# Patient Record
Sex: FEMALE | Race: OTHER | HISPANIC OR LATINO | URBAN - METROPOLITAN AREA
[De-identification: names, ages, dates, MRNs, and addresses within clinical notes are randomized per-mention and may not be internally consistent; named-entity substitution may affect disease eponyms.]

---

## 2024-01-08 ENCOUNTER — OFFICE VISIT (OUTPATIENT)
Dept: INTERNAL MEDICINE | Facility: CLINIC | Age: 29
End: 2024-01-08

## 2024-01-08 VITALS
HEART RATE: 71 BPM | DIASTOLIC BLOOD PRESSURE: 80 MMHG | SYSTOLIC BLOOD PRESSURE: 126 MMHG | HEIGHT: 66 IN | TEMPERATURE: 98 F | BODY MASS INDEX: 29.55 KG/M2 | WEIGHT: 183.88 LBS | RESPIRATION RATE: 16 BRPM | OXYGEN SATURATION: 97 %

## 2024-01-08 DIAGNOSIS — H65.02 NON-RECURRENT ACUTE SEROUS OTITIS MEDIA OF LEFT EAR: Primary | ICD-10-CM

## 2024-01-08 PROBLEM — Z30.2 ENCOUNTER FOR STERILIZATION: Status: ACTIVE | Noted: 2021-12-06

## 2024-01-08 PROBLEM — R04.0 FREQUENT NOSEBLEEDS: Status: ACTIVE | Noted: 2017-07-26

## 2024-01-08 PROCEDURE — 99999 PR PBB SHADOW E&M-NEW PATIENT-LVL IV: CPT | Mod: PBBFAC,,, | Performed by: STUDENT IN AN ORGANIZED HEALTH CARE EDUCATION/TRAINING PROGRAM

## 2024-01-08 PROCEDURE — 99214 OFFICE O/P EST MOD 30 MIN: CPT | Mod: S$PBB,,, | Performed by: STUDENT IN AN ORGANIZED HEALTH CARE EDUCATION/TRAINING PROGRAM

## 2024-01-08 PROCEDURE — 99204 OFFICE O/P NEW MOD 45 MIN: CPT | Mod: PBBFAC,PO | Performed by: STUDENT IN AN ORGANIZED HEALTH CARE EDUCATION/TRAINING PROGRAM

## 2024-01-08 RX ORDER — MECLIZINE HCL 12.5 MG 12.5 MG/1
12.5 TABLET ORAL 2 TIMES DAILY PRN
Qty: 10 TABLET | Refills: 0 | Status: SHIPPED | OUTPATIENT
Start: 2024-01-08 | End: 2024-01-15

## 2024-01-08 RX ORDER — AMOXICILLIN AND CLAVULANATE POTASSIUM 875; 125 MG/1; MG/1
1 TABLET, FILM COATED ORAL EVERY 12 HOURS
Qty: 14 TABLET | Refills: 0 | Status: SHIPPED | OUTPATIENT
Start: 2024-01-08 | End: 2024-01-15

## 2024-01-08 NOTE — PROGRESS NOTES
"  Chief Complaint   Patient presents with    Otalgia     Right ear pain x 1 week     HPI: Joceline Darby is a 28 y.o. female  with Pmhx listed below who presents to clinic for evaluation of otalgia. As per the patient, she has been having dizziness all day through out the day for last 7 days. She reports that when she puts "ear drop in her ear ,it doesn't go in". She has been using OTC ear drop on her right earache. She reports that when she applies pressure in her ear, the dizziness stops.  She dneies having fever, but does report to have chills. She reports that " room is spinning around her". She denies having any issues with hearing. She denies having runny nose,congestion, sick contact at this time. She has been taking left over amoxicillin once a day for last 2 days which has not been helping either.     Problem List:  Patient Active Problem List   Diagnosis    Frequent nosebleeds    Encounter for sterilization       ROS: Negative except as noted above.       Current Meds:  Current Outpatient Medications   Medication Sig Dispense Refill    amoxicillin-clavulanate 875-125mg (AUGMENTIN) 875-125 mg per tablet Take 1 tablet by mouth every 12 (twelve) hours. for 7 days 14 tablet 0    meclizine (ANTIVERT) 12.5 mg tablet Take 1 tablet (12.5 mg total) by mouth 2 (two) times daily as needed for Dizziness. 10 tablet 0     No current facility-administered medications for this visit.      PE:  BP: 126/80  Pulse: 71 Resp: 16   Temp: 97.5 °F (36.4 °C)  Weight: 83.4 kg (183 lb 13.8 oz) Body mass index is 29.68 kg/m².    Wt Readings from Last 5 Encounters:   01/08/24 83.4 kg (183 lb 13.8 oz)     General appearance: alert and cooperative, not in acute distress  Head: normocephalic, without obvious abnormality, atraumatic  Eyes: conjunctivae/corneas clear. PERRL, EOM's intact.  Ears: Bulged TM on right side, left side is normal.  Neck: no adenopathy, supple, symmetrical, trachea midline and thyroid not enlarged, symmetric, " "no tenderness/mass/nodules, no JVD  Throat: lips, mucosa, and tongue normal; teeth and gums normal; no thrush  Chest: no reproducible chest pain   Heart: regular rate and rhythm, S1, S2 normal, no murmur, click, rub or gallop  Lungs: unlabored respiration, bilateral equal air entry, normal vesicular breath sound heard, no wheezing, rhonchi   Abdomen: soft, non-tender, non-distended; bowel sounds +; no masses,  no organomegaly, no ascites   Extremities: normal, atraumatic, no cyanosis or edema noted B/L upper and lower extremities.  Skin: skin color, texture, turgor normal. No rashes or lesions noted.  Neurologic: grossly intact      Lab:  No results found for: "WBC", "HGB", "HCT", "MCV", "PLT", "NA", "K", "CL", "CO2", "LABCREA", "BUN", "GLU", "CALCIUM", "MG", "PHOS", "AST", "ALT", "CHOL", "HDL", "LDLCALC", "TRIG", "TSH", "PSA", "INR"    Impression:    ICD-10-CM ICD-9-CM    1. Non-recurrent acute serous otitis media of left ear  H65.02 381.01 amoxicillin-clavulanate 875-125mg (AUGMENTIN) 875-125 mg per tablet      meclizine (ANTIVERT) 12.5 mg tablet        1. Non-recurrent acute serous otitis media of left ear    - amoxicillin-clavulanate 875-125mg (AUGMENTIN) 875-125 mg per tablet; Take 1 tablet by mouth every 12 (twelve) hours. for 7 days  Dispense: 14 tablet; Refill: 0  - meclizine (ANTIVERT) 12.5 mg tablet; Take 1 tablet (12.5 mg total) by mouth 2 (two) times daily as needed for Dizziness.  Dispense: 10 tablet; Refill: 0      Rtc PRN for persistence of symptoms or failure to improve      Marky MD [place]  "